# Patient Record
Sex: FEMALE | Race: ASIAN | Employment: FULL TIME | URBAN - METROPOLITAN AREA
[De-identification: names, ages, dates, MRNs, and addresses within clinical notes are randomized per-mention and may not be internally consistent; named-entity substitution may affect disease eponyms.]

---

## 2018-10-24 ENCOUNTER — HOSPITAL ENCOUNTER (EMERGENCY)
Facility: CLINIC | Age: 28
Discharge: HOME | End: 2018-10-24
Payer: COMMERCIAL

## 2018-10-25 ENCOUNTER — OFFICE VISIT (OUTPATIENT)
Dept: OBGYN | Facility: CLINIC | Age: 28
End: 2018-10-25
Payer: COMMERCIAL

## 2018-10-25 ENCOUNTER — RADIANT APPOINTMENT (OUTPATIENT)
Dept: ULTRASOUND IMAGING | Facility: CLINIC | Age: 28
End: 2018-10-25
Attending: FAMILY MEDICINE
Payer: COMMERCIAL

## 2018-10-25 VITALS
BODY MASS INDEX: 22.24 KG/M2 | HEIGHT: 61 IN | DIASTOLIC BLOOD PRESSURE: 72 MMHG | SYSTOLIC BLOOD PRESSURE: 108 MMHG | WEIGHT: 117.8 LBS

## 2018-10-25 DIAGNOSIS — O20.0 THREATENED MISCARRIAGE: ICD-10-CM

## 2018-10-25 DIAGNOSIS — N83.209 CYST OF OVARY, UNSPECIFIED LATERALITY: ICD-10-CM

## 2018-10-25 DIAGNOSIS — O20.0 THREATENED MISCARRIAGE: Primary | ICD-10-CM

## 2018-10-25 DIAGNOSIS — O03.9 SPONTANEOUS ABORTION: Primary | ICD-10-CM

## 2018-10-25 LAB — B-HCG SERPL-ACNC: 4 IU/L (ref 0–5)

## 2018-10-25 PROCEDURE — 84702 CHORIONIC GONADOTROPIN TEST: CPT | Performed by: FAMILY MEDICINE

## 2018-10-25 PROCEDURE — 88305 TISSUE EXAM BY PATHOLOGIST: CPT | Performed by: FAMILY MEDICINE

## 2018-10-25 PROCEDURE — 00000159 ZZHCL STATISTIC H-SEND OUTS PREP: Performed by: FAMILY MEDICINE

## 2018-10-25 PROCEDURE — 36415 COLL VENOUS BLD VENIPUNCTURE: CPT | Performed by: FAMILY MEDICINE

## 2018-10-25 PROCEDURE — 99203 OFFICE O/P NEW LOW 30 MIN: CPT | Performed by: FAMILY MEDICINE

## 2018-10-25 PROCEDURE — 76817 TRANSVAGINAL US OBSTETRIC: CPT | Performed by: FAMILY MEDICINE

## 2018-10-25 PROCEDURE — 76815 OB US LIMITED FETUS(S): CPT | Performed by: FAMILY MEDICINE

## 2018-10-25 NOTE — PATIENT INSTRUCTIONS
Return in 1 week, will call with result     Call on Saturday with lab result   [phone]  Call for result       Dr. Eleanor Sparks,     Obstetrics and Gynecology  [facility] [place] and [place]

## 2018-10-25 NOTE — PROGRESS NOTES
SUBJECTIVE:  Fred Hernandez is an 28 year old woman who presents for   gynecology consult for spotting in early pregnancy, possible miscarriage.  Patient's last menstrual period was 09/18/2018. Currently 5w2d -- experiencing vaginal bleeding, not menses.    Current contraception: none  History of abnormal Pap smear: No  Family history of uterine or ovarian cancer: No  History of abnormal mammogram: No  Family history of breast cancer: Yes - distant cousin    Concerns today:     - Pt experienced a lot of bleeding yesterday, reported to ED but the wait was too long & she was sure she had lost the fetus. The symptoms began with moderate to severe abdominal/pelvic cramping & then began noticing vaginal bleeding with clotting. Both have since begun resolving, with the bleeding now only mild spotting & the pelvic cramping dull instead of intense.    > This is patient's first pregnancy [no previous miscarriages], began trying in Aug  2018 & confirmed pregnancy on 10/22/2018    - Used withdrawal method & natural family planning for contraception   > Two cousins with strong hx of miscarriage      History reviewed. No pertinent past medical history.       Family History   Problem Relation Age of Onset     Diabetes Mother      Hypertension Mother      Diabetes Maternal Grandmother      Hypertension Maternal Grandmother      Hypertension Maternal Grandfather      Lung Cancer Maternal Grandfather      Breast Cancer Other        History reviewed. No pertinent surgical history.    No current outpatient prescriptions on file.     No current facility-administered medications for this visit.      Not on File    Social History   Substance Use Topics     Smoking status: Never Smoker     Smokeless tobacco: Never Used     Alcohol use No       Review Of Systems  Ears/Nose/Throat: negative  Respiratory: No shortness of breath, dyspnea on exertion, cough, or hemoptysis  Cardiovascular: negative  Gastrointestinal:  "negative  Genitourinary: vaginal bleeding & pelvic cramping; otherwise negative  Constitutional, HEENT, cardiovascular, pulmonary, GI, , musculoskeletal, neuro, skin, endocrine and psych systems are negative, except as otherwise noted.      This document serves as a record of the services and decisions personally performed and made by Eleanor Sparks DO. It was created on her behalf by Rylee Menchaca, a trained medical scribe. The creation of this document is based the provider's statements to the medical scribe.  Scribe Rylee Menchaca 8:23 AM, October 25, 2018    OBJECTIVE:  /72  Ht 1.549 m (5' 1\")  Wt 53.4 kg (117 lb 12.8 oz)  LMP 09/18/2018  BMI 22.26 kg/m2  General appearance: healthy, alert and no distress  EYES EOMI, intact visual fields   HENT: Normocephalic.   NECK: no adenopathy, no asymmetry, masses, or scars   Lungs: negative, Percussion normal. Good diaphragmatic excursion. Lungs clear  Heart: negative, PMI normal. No lifts, heaves, or thrills. RRR. No murmurs, clicks gallops or rub  SKIN: no ulcers, lesions or rash  NEURO: alert/oriented to person, location and time, CN 2-12 intact, brisk, strength 5/5 throughout and symmetric, sensation to light touch grossly intact throughout  PSYCH: NEGATIVE  Abdomen: Mild tenderness with bimanual pelvic exam: Otherwise abdomen soft, non-tender. BS normal. No masses, organomegaly  Pelvic: Pelvic examination without pap/ Gonorrhea and Chlamydia   including  External genitalia normal   and vagina normal rugatted not atrophic  Examination of urethra normal no masses, tenderness, scarring  bladder, no masses or tenderness  Cervix no lesions or discharge  Bimanual exam with   Uterus 7 weeks size, mid position, mobile, mild tenderness, no descent   Adnexa/parametria normal  Clotting & tissue seen in cervix, gently removed with ring forceps     LABS:  HCG    ASSESSMENT:  Fred Hernandez is an 28 year old woman who presents for   gynecology consult for spotting in " early pregnancy, possible miscarriage.     PLAN:  Dx: Miscarriage vs ectopic pregnancy vs threatened   1)  Labs pending; Tissue sample - pathology pending, possible placental tissue; US ordered to confirm, scheduled for today 10/25/2018   - Advised to call on Saturday after lab draw for result; if HCG levels are decreasing it is most likely miscarriage, if increasing/doubling then we will follow closely to rule out ectopic pregnancy    > Strongly encouraged to report to ED with any severe abdominal/pelvic pain  2)  Informed workup can be completed to check for possible causes of miscarriage, due to strong family hx   - Pt will wait to do so at this time, will reconsider if another miscarriage occurs  3)  Return to clinic as needed.       Rx:  None      The information in this document, created by the medical scribe for me, accurately reflects the services I personally performed and the decisions made by me. I have reviewed and approved this document for accuracy prior to leaving the patient care area.  8:23 AM, 10/25/18    Dr. Eleanor Sparks, DO    Obstetrics and Gynecology  [facility] and [place]

## 2018-10-25 NOTE — NURSING NOTE
Scheduled for ultrasound today at 1:15pm in [facility]. Pt advised and given instructions and directions.  Myla Ahumada CMA

## 2018-10-25 NOTE — MR AVS SNAPSHOT
After Visit Summary   10/25/2018    Frde Hernandez    MRN: [id number]           Patient Information     Date Of Birth          1990        Visit Information        Provider Department      10/25/2018 8:00 AM Eleanor Sparks,  [facility]        Today's Diagnoses     Threatened miscarriage    -  1      Care Instructions    Return in 1 week, will call with result     Call on Saturday with lab result   [phone]  Call for result       Dr. Eleanor Sparks,     Obstetrics and Gynecology  [facility] and [place]                 Follow-ups after your visit        Your next 10 appointments already scheduled     Nov 05, 2018  2:00 PM CST   New Prenatal with RI PRENATAL NURSE   [facility] ([facility]    [address]  [facility] [id number] [phone]            Nov 19, 2018  5:30 PM CST   Ultrasound with RIUS1   [facility] ([facility])    [address]  [address]  [facility] [id number] [phone]            Dec 11, 2018  4:00 PM CST   New Prenatal with Jelani Carpenter MD   [facility] ([facility])    [address]  [address]  [facility] [phone] [phone]              Future tests that were ordered for you today     Open Standing Orders        Priority Remaining Interval Expires Ordered    HCG quantitative pregnancy STAT 6/6 48 hours 10/25/2019 10/25/2018          Open Future Orders        Priority Expected Expires Ordered    US OB < 14 Weeks Single Routine 10/25/2018 10/25/2019 10/25/2018            Who to contact     If you have questions or need follow up information about today's clinic visit or your schedule please contact [facility] directly at [phone].  Normal or non-critical lab and imaging results will be communicated to you by MyChart, letter or phone within 4  "business days after the clinic has received the results. If you do not hear from us within 7 days, please contact the clinic through [employer] or phone. If you have a critical or abnormal lab result, we will notify you by phone as soon as possible.  Submit refill requests through [employer] or call your pharmacy and they will forward the refill request to us. Please allow 3 business days for your refill to be completed.          Additional Information About Your Visit        [employer] Information     [employer] lets you send messages to your doctor, view your test results, renew your prescriptions, schedule appointments and more. To sign up, go to [email or web address] . Click on \"Log in\" on the left side of the screen, which will take you to the Welcome page. Then click on \"Sign up Now\" on the right side of the page.     You will be asked to enter the access code listed below, as well as some personal information. Please follow the directions to create your username and password.     Your access code is: [email or web address]  Expires: 2019  8:40 AM     Your access code will  in 90 days. If you need help or a new code, please call your [facility] clinic or [phone].        Care EveryWhere ID     This is your Care EveryWhere ID. This could be used by other organizations to access your [facility] medical records  [id number]        Your Vitals Were     Height Last Period BMI (Body Mass Index)             5' 1\" (1.549 m) 2018 22.26 kg/m2          Blood Pressure from Last 3 Encounters:   10/25/18 108/72    Weight from Last 3 Encounters:   10/25/18 117 lb 12.8 oz (53.4 kg)               Primary Care Provider Fax #    Physician No Ref-Primary [phone]       No address on file        Equal Access to Services     BROOKS TUCKER : Kalli Villarreal, belkys moore, derian issa, dominick wilson. So [facility] [phone].    ATENCIÓN: Si habla español, tiene a simons disposición " servicios gratuitos de asistencia lingüística. Maria C orozco [phone].    We comply with applicable federal civil rights laws and [place] laws. We do not discriminate on the basis of race, color, national origin, age, disability, sex, sexual orientation, or gender identity.            Thank you!     Thank you for choosing [facility]  for your care. Our goal is always to provide you with excellent care. Hearing back from our patients is one way we can continue to improve our services. Please take a few minutes to complete the written survey that you may receive in the mail after your visit with us. Thank you!             Your Updated Medication List - Protect others around you: Learn how to safely use, store and throw away your medicines at www.disposemymeds.org.      Notice  As of 10/25/2018  8:40 AM    You have not been prescribed any medications.

## 2018-10-26 ENCOUNTER — TELEPHONE (OUTPATIENT)
Dept: OBGYN | Facility: CLINIC | Age: 28
End: 2018-10-26

## 2018-10-26 LAB — COPATH REPORT: NORMAL

## 2018-10-26 NOTE — TELEPHONE ENCOUNTER
Called by pathology. No products of conception in specimen. Chart reviewed HCG yesterday is 4. Please notify patient. Repeat quantitative HCG on Monday.

## 2018-10-27 ENCOUNTER — HOSPITAL ENCOUNTER (OUTPATIENT)
Dept: LAB | Facility: CLINIC | Age: 28
Discharge: HOME | End: 2018-10-27
Admitting: FAMILY MEDICINE
Payer: COMMERCIAL

## 2018-10-27 DIAGNOSIS — O20.0 THREATENED MISCARRIAGE: ICD-10-CM

## 2018-10-27 LAB — B-HCG SERPL-ACNC: 1 IU/L (ref 0–5)

## 2018-10-27 PROCEDURE — 36415 COLL VENOUS BLD VENIPUNCTURE: CPT | Performed by: FAMILY MEDICINE

## 2018-10-27 PROCEDURE — 84702 CHORIONIC GONADOTROPIN TEST: CPT | Performed by: FAMILY MEDICINE

## 2018-11-21 ENCOUNTER — HEALTH MAINTENANCE LETTER (OUTPATIENT)
Age: 28
End: 2018-11-21

## 2019-01-23 ENCOUNTER — HOSPITAL ENCOUNTER (OUTPATIENT)
Dept: ULTRASOUND IMAGING | Facility: CLINIC | Age: 29
Discharge: HOME | End: 2019-01-23
Attending: UROLOGY | Admitting: UROLOGY
Payer: COMMERCIAL

## 2019-01-23 ENCOUNTER — OFFICE VISIT (OUTPATIENT)
Dept: OBGYN | Facility: CLINIC | Age: 29
End: 2019-01-23
Payer: COMMERCIAL

## 2019-01-23 VITALS
SYSTOLIC BLOOD PRESSURE: 104 MMHG | DIASTOLIC BLOOD PRESSURE: 68 MMHG | HEIGHT: 61 IN | BODY MASS INDEX: 22.22 KG/M2 | WEIGHT: 117.7 LBS

## 2019-01-23 DIAGNOSIS — Z87.59 HISTORY OF MISCARRIAGE: ICD-10-CM

## 2019-01-23 DIAGNOSIS — Z32.01 PREGNANCY TEST POSITIVE: ICD-10-CM

## 2019-01-23 DIAGNOSIS — Z32.01 PREGNANCY TEST POSITIVE: Primary | ICD-10-CM

## 2019-01-23 LAB
ABO + RH BLD: NORMAL
ABO + RH BLD: NORMAL
B-HCG SERPL-ACNC: 5923 IU/L (ref 0–5)
SPECIMEN EXP DATE BLD: NORMAL

## 2019-01-23 PROCEDURE — 99213 OFFICE O/P EST LOW 20 MIN: CPT | Performed by: OBSTETRICS & GYNECOLOGY

## 2019-01-23 PROCEDURE — 86900 BLOOD TYPING SEROLOGIC ABO: CPT | Performed by: OBSTETRICS & GYNECOLOGY

## 2019-01-23 PROCEDURE — 84702 CHORIONIC GONADOTROPIN TEST: CPT | Performed by: OBSTETRICS & GYNECOLOGY

## 2019-01-23 PROCEDURE — 76801 OB US < 14 WKS SINGLE FETUS: CPT

## 2019-01-23 PROCEDURE — 36415 COLL VENOUS BLD VENIPUNCTURE: CPT | Performed by: OBSTETRICS & GYNECOLOGY

## 2019-01-23 PROCEDURE — 86901 BLOOD TYPING SEROLOGIC RH(D): CPT | Performed by: OBSTETRICS & GYNECOLOGY

## 2019-01-23 ASSESSMENT — MIFFLIN-ST. JEOR: SCORE: 1191.26

## 2019-01-23 NOTE — PROGRESS NOTES
"Chief Complaint   Patient presents with     Physical     fasting       Subjective:  27 yo  presents for early pregnancy confirmation. 2 + HPTs    Had early loss in 10/2018.  Certain LMP 18 suggesting 7w6d presently but LMP was after sab.    Denies pain or bleeding.    REVIEW OF SYSTEMS:  Neg except as above    Health Maintenance   Topic Date Due     PHQ-2 Q1 YR  2000     HIV SCREEN (SYSTEM ASSIGNED)  2006     PAP SCREENING Q3 YR (SYSTEM ASSIGNED)  2009     DTAP/TDAP/TD IMMUNIZATION (1 - Tdap) 2013     INFLUENZA VACCINE (1) 2018     ZOSTER IMMUNIZATION (1 of 2) 2038     IPV IMMUNIZATION  Aged Out     MENINGITIS IMMUNIZATION  Aged Out       No Known Allergies    Objective:  Vitals: /68   Ht 1.549 m (5' 1\")   Wt 53.4 kg (117 lb 11.2 oz)   LMP 2018 (Exact Date)   BMI 22.24 kg/m    BMI= Body mass index is 22.24 kg/m .    Attempted bedside sono but was unable to well visualize the uterus due to retroversion  Assessment/Plan:  1. Pregnancy test positive  Hx of early loss 10/2018  - US Pelvic Complete with Transvaginal; Future  - HCG Quantitative Pregnancy, Blood (TBQ814); Future  - HCG Quantitative Pregnancy, Blood (SKB851)  - ABO and Rh    2. History of miscarriage    - US Pelvic Complete with Transvaginal; Future  - HCG Quantitative Pregnancy, Blood (JOF267); Future  - HCG Quantitative Pregnancy, Blood (LWQ211)  - ABO and Rh        Francis Carpenter MD  "

## 2019-01-23 NOTE — NURSING NOTE
"Chief Complaint   Patient presents with     Physical     fasting   Patient states positive home pregnancy test last week.    Initial /68   Ht 1.549 m (5' 1\")   Wt 53.4 kg (117 lb 11.2 oz)   LMP 2018 (Exact Date)   BMI 22.24 kg/m   Estimated body mass index is 22.24 kg/m  as calculated from the following:    Height as of this encounter: 1.549 m (5' 1\").    Weight as of this encounter: 53.4 kg (117 lb 11.2 oz).  BP completed using cuff size: regular    Questioned patient about current smoking habits.  Pt. has never smoked.          The following HM Due: khadar Robert CMA                "

## 2019-01-25 DIAGNOSIS — O20.0 THREATENED MISCARRIAGE: ICD-10-CM

## 2019-01-25 LAB — B-HCG SERPL-ACNC: 7214 IU/L (ref 0–5)

## 2019-01-25 PROCEDURE — 36415 COLL VENOUS BLD VENIPUNCTURE: CPT | Performed by: FAMILY MEDICINE

## 2019-01-25 PROCEDURE — 84702 CHORIONIC GONADOTROPIN TEST: CPT | Performed by: FAMILY MEDICINE

## 2019-01-28 ENCOUNTER — MYC MEDICAL ADVICE (OUTPATIENT)
Dept: OBGYN | Facility: CLINIC | Age: 29
End: 2019-01-28

## 2019-01-28 DIAGNOSIS — Z32.01 PREGNANCY EXAMINATION OR TEST, POSITIVE RESULT: Primary | ICD-10-CM

## 2019-02-01 ENCOUNTER — ANCILLARY PROCEDURE (OUTPATIENT)
Dept: ULTRASOUND IMAGING | Facility: CLINIC | Age: 29
End: 2019-02-01
Payer: COMMERCIAL

## 2019-02-01 ENCOUNTER — TELEPHONE (OUTPATIENT)
Dept: OBGYN | Facility: CLINIC | Age: 29
End: 2019-02-01

## 2019-02-01 DIAGNOSIS — Z32.01 PREGNANCY EXAMINATION OR TEST, POSITIVE RESULT: ICD-10-CM

## 2019-02-01 DIAGNOSIS — Z87.59 HISTORY OF MISCARRIAGE: ICD-10-CM

## 2019-02-01 DIAGNOSIS — Z32.01 PREGNANCY TEST POSITIVE: ICD-10-CM

## 2019-02-01 LAB — B-HCG SERPL-ACNC: ABNORMAL IU/L (ref 0–5)

## 2019-02-01 PROCEDURE — 84702 CHORIONIC GONADOTROPIN TEST: CPT | Performed by: OBSTETRICS & GYNECOLOGY

## 2019-02-01 PROCEDURE — 76817 TRANSVAGINAL US OBSTETRIC: CPT | Performed by: OBSTETRICS & GYNECOLOGY

## 2019-02-01 PROCEDURE — 36415 COLL VENOUS BLD VENIPUNCTURE: CPT | Performed by: OBSTETRICS & GYNECOLOGY

## 2019-02-01 PROCEDURE — 76815 OB US LIMITED FETUS(S): CPT | Performed by: OBSTETRICS & GYNECOLOGY

## 2019-02-01 NOTE — TELEPHONE ENCOUNTER
Pt was in for an US today, 2/1/19, and following the appointment stated she needed to schedule her initial prenatal care visits. Scheduled the pt for her NPN and first New Prenatal with provider, pt is wondering if she will need to do another US in between those 2 scheduled appointments or if the US today would replace that? Please advise

## 2019-02-01 NOTE — TELEPHONE ENCOUNTER
Discussed with Gypsy that most likely the pt will not need a repeat US. She will only need one repeated if there is an abnormal finding on her early US, or if baby is measuring too small to confirm viability.        Elly Raman RN

## 2019-02-05 ENCOUNTER — TELEPHONE (OUTPATIENT)
Dept: OBGYN | Facility: CLINIC | Age: 29
End: 2019-02-05

## 2019-02-05 NOTE — TELEPHONE ENCOUNTER
Pt calls with light brown spotting, no cramping.    Advised to monitor and let us know if it increase or cramping increases.    Pt comfortable with this plan.    Lab Results   Component Value Date    ABO O 01/23/2019    RH Pos 01/23/2019         Allie HANSEN R.N.  [facility]

## 2019-02-06 ENCOUNTER — TELEPHONE (OUTPATIENT)
Dept: OBGYN | Facility: CLINIC | Age: 29
End: 2019-02-06

## 2019-02-06 DIAGNOSIS — O26.859 SPOTTING IN EARLY PREGNANCY: Primary | ICD-10-CM

## 2019-02-06 DIAGNOSIS — O20.0 THREATENED MISCARRIAGE: ICD-10-CM

## 2019-02-06 LAB — B-HCG SERPL-ACNC: ABNORMAL IU/L (ref 0–5)

## 2019-02-06 PROCEDURE — 84702 CHORIONIC GONADOTROPIN TEST: CPT | Performed by: FAMILY MEDICINE

## 2019-02-06 PROCEDURE — 36415 COLL VENOUS BLD VENIPUNCTURE: CPT | Performed by: FAMILY MEDICINE

## 2019-02-06 NOTE — TELEPHONE ENCOUNTER
Component      Latest Ref Rng & Units 1/25/2019 2/1/2019 2/6/2019   HCG Quantitative Serum      0 - 5 IU/L 7,214 (H) 14,140 (H) 18,164 (H)   Results reviewed  With Dr Cueva.'  Recommend repeat ultrasound 1 week from last ultrasound.  Patient advises to call back if bleeding worsens, pelvic pain or for any other concerns.  Rosana Gale RN

## 2019-02-06 NOTE — TELEPHONE ENCOUNTER
Patient calling:  Blood type O +.   states spotting has increased. Brown.  Some cramping, more pressure.  States no longer has pregnancy symptoms.    GA 7wk    Component      Latest Ref Rng & Units 1/25/2019 2/1/2019   HCG Quantitative Serum      0 - 5 IU/L 7,214 (H) 14,140 (H)     Will repeat HCG quant and possible repeat US after labs return.  See ultrasound result 2/1/19  Rosana Gale RN

## 2019-02-08 ENCOUNTER — HOSPITAL ENCOUNTER (OUTPATIENT)
Dept: ULTRASOUND IMAGING | Facility: CLINIC | Age: 29
Discharge: HOME | End: 2019-02-08
Attending: OBSTETRICS & GYNECOLOGY | Admitting: OBSTETRICS & GYNECOLOGY
Payer: COMMERCIAL

## 2019-02-08 DIAGNOSIS — O26.859 SPOTTING IN EARLY PREGNANCY: ICD-10-CM

## 2019-02-08 PROCEDURE — 76801 OB US < 14 WKS SINGLE FETUS: CPT

## 2019-02-11 DIAGNOSIS — Z34.90 SUPERVISION OF NORMAL PREGNANCY: Primary | ICD-10-CM

## 2019-02-28 ENCOUNTER — PRENATAL OFFICE VISIT (OUTPATIENT)
Dept: NURSING | Facility: CLINIC | Age: 29
End: 2019-02-28
Payer: COMMERCIAL

## 2019-02-28 DIAGNOSIS — Z34.90 SUPERVISION OF NORMAL PREGNANCY: ICD-10-CM

## 2019-02-28 LAB
ERYTHROCYTE [DISTWIDTH] IN BLOOD BY AUTOMATED COUNT: 12.2 % (ref 10–15)
HCT VFR BLD AUTO: 42.2 % (ref 35–47)
HGB BLD-MCNC: 13.9 G/DL (ref 11.7–15.7)
MCH RBC QN AUTO: 30.7 PG (ref 26.5–33)
MCHC RBC AUTO-ENTMCNC: 32.9 G/DL (ref 31.5–36.5)
MCV RBC AUTO: 93 FL (ref 78–100)
PLATELET # BLD AUTO: 283 10E9/L (ref 150–450)
RBC # BLD AUTO: 4.53 10E12/L (ref 3.8–5.2)
WBC # BLD AUTO: 5.9 10E9/L (ref 4–11)

## 2019-02-28 PROCEDURE — 86762 RUBELLA ANTIBODY: CPT | Performed by: OBSTETRICS & GYNECOLOGY

## 2019-02-28 PROCEDURE — 87340 HEPATITIS B SURFACE AG IA: CPT | Performed by: OBSTETRICS & GYNECOLOGY

## 2019-02-28 PROCEDURE — 99207 ZZC NO CHARGE NURSE ONLY: CPT

## 2019-02-28 PROCEDURE — 87086 URINE CULTURE/COLONY COUNT: CPT | Performed by: OBSTETRICS & GYNECOLOGY

## 2019-02-28 PROCEDURE — 87389 HIV-1 AG W/HIV-1&-2 AB AG IA: CPT | Performed by: OBSTETRICS & GYNECOLOGY

## 2019-02-28 PROCEDURE — 0064U ANTB TP TOTAL&RPR IA QUAL: CPT | Performed by: OBSTETRICS & GYNECOLOGY

## 2019-02-28 PROCEDURE — 36415 COLL VENOUS BLD VENIPUNCTURE: CPT | Performed by: OBSTETRICS & GYNECOLOGY

## 2019-02-28 PROCEDURE — 85027 COMPLETE CBC AUTOMATED: CPT | Performed by: OBSTETRICS & GYNECOLOGY

## 2019-02-28 NOTE — NURSING NOTE
NPN nurse visit. 1st dr visit scheduled for 3/13/19 with Faisal Young M.D.  Pap not due. Last pap 2016?.  10w1d    LAURA Calhoun RN

## 2019-03-01 ENCOUNTER — NURSE TRIAGE (OUTPATIENT)
Dept: NURSING | Facility: CLINIC | Age: 29
End: 2019-03-01

## 2019-03-01 LAB
BACTERIA SPEC CULT: NO GROWTH
HBV SURFACE AG SERPL QL IA: NONREACTIVE
HIV 1+2 AB+HIV1 P24 AG SERPL QL IA: NONREACTIVE
RUBV IGG SERPL IA-ACNC: 98 IU/ML
SPECIMEN SOURCE: NORMAL
T PALLIDUM AB SER QL: NONREACTIVE

## 2019-03-02 NOTE — TELEPHONE ENCOUNTER
"\"I am 10 weeks pregnant, I was seen just for a nurse visit( see epic). All labs were normal. But last week 1-2 days I had darker brown discharge and today I am having it. It's a very small amount either on a panty liner or in my underwear.\"Denies pain, bright red bleeding, odor fever or other sx. Triaged and gave home care advice. Call back if needed.    Additional Information    Negative: [1] Vaginal bleeding AND [2] pregnant > 20 weeks    Negative: [1] Vaginal bleeding AND [2] pregnant < 20 weeks    Negative: [1] Having contractions or other symptoms of labor AND [2] < 37 weeks pregnant (i.e., )    Negative: [1] Having contractions or other symptoms of labor AND [2] > 36 weeks pregnant (i.e., term pregnancy)    Negative: Leakage of fluid (trickle, gush) from the vagina    Negative: Foreign body in vagina (e.g., tampon)    Negative: Pain or burning with urination is main symptom    Negative: [1] Pregnant 23 or more weeks AND [2] baby is moving less today (e.g., kick count < 5 in 1 hour or < 10 in 2 hours)    Negative: Patient sounds very sick or weak to the triager    Negative: [1] Constant abdominal pain AND [2] present > 2 hours    Negative: [1] Intermittent lower abdominal pain AND [2] present > 24 hours    Negative: [1] Pregnant 24-36 weeks () AND [2] pinkish or brownish mucous discharge    Negative: [1] Yellow or green vaginal discharge AND [2] fever    Negative: Painful rash with tiny water blisters    Negative: [1] Rash (e.g., redness, tiny bumps, sore) of genital area AND [2] present > 24 hours    Negative: Abnormal color vaginal discharge (i.e., yellow, green, gray)    Negative: Bad smelling vaginal discharge    Negative: Tender lump (swelling or \"ball\") at vaginal opening    Negative: [1] Symptoms of a \"yeast infection\" (i.e., itchy, white discharge, not bad smelling) AND [2] not improved > 3 days following CARE ADVICE    Negative: Patient is worried about sexually transmitted disease " (STD)    Negative: Pain with sexual intercourse (dyspareunia)    Negative: [1] Pregnant > 36 weeks (term) AND [2] pinkish or brownish mucous discharge (all triage questions negative)    Negative: [1] Pregnant > 36 weeks (term) AND [2] passed a small glob or chunk of mucous (may look like gelatin or snot) (all triage questions negative)    Negative: [1] Rash (e.g., redness, tiny bumps, sore) of genital area AND [2] present < 24 hours    Negative: Symptoms of a vaginal yeast infection (i.e., white, thick, cottage-cheese-like, itchy, not bad smelling discharge)    Normal vaginal discharge in pregnancy (all triage questions negative)    Protocols used: PREGNANCY - VAGINAL DISCHARGE-ADULT-

## 2019-03-03 ENCOUNTER — NURSE TRIAGE (OUTPATIENT)
Dept: NURSING | Facility: CLINIC | Age: 29
End: 2019-03-03

## 2019-03-03 NOTE — TELEPHONE ENCOUNTER
10.5 weeks pregnant  Patient calls re: persistent vaginal bleeding/discharge.  Patient requested FNA speak to  instead.   reports patient has been having spotting for the past 2-3 weeks.   says patient now has more bleeding.  Patient soaked about 1/5 of a sanitary pad today which is more than what has been happening.   describes blood as maroon in color.   says patient felt sick last night.  Unable to check if patient has a fever or not because does not have a thermometer.  Patient has no abdominal pain or cramping.  Per , patient did have a miscarriage 4 months ago.  Reviewed guideline and care advice with caller to have patient be evaluated within 3 days.  Caller verbalizes understanding.  Transferred call to schedule office visit per guideline.      Reason for Disposition    MILD vaginal bleeding (i.e., clots or similar to menstrual period; not just spotting)    Additional Information    Negative: Shock suspected (e.g., cold/pale/clammy skin, too weak to stand, low BP, rapid pulse)    Negative: Difficult to awaken or acting confused  (e.g., disoriented, slurred speech)    Negative: Passed out (i.e., lost consciousness, collapsed and was not responding)    Negative: Sounds like a life-threatening emergency to the triager    Negative: [1] Vaginal bleeding AND [2] pregnant > 20 weeks    Negative: Not pregnant or pregnancy status unknown    Negative: SEVERE abdominal pain    Negative: [1] SEVERE vaginal bleeding (i.e., soaking 2 pads / hour, large blood clots) AND [2] present 2 or more hours    Negative: [1] MODERATE vaginal bleeding (i.e., soaking 1 pad / hour; clots) AND [2] present > 6 hours    Negative: [1] MODERATE vaginal bleeding (i.e., soaking 1 pad / hour; clots) AND [2] pregnant > 12 weeks    Negative: Passed tissue (e.g., gray-white)    Negative: Shoulder pain    Negative: Pale skin (pallor) of new onset or worsening    Negative: Patient sounds very sick or weak to  "the triager    Negative: [1] Constant abdominal pain AND [2] present > 2 hours    Negative: Fever > 100.4 F (38.0 C)    Negative: [1] Intermittent lower abdominal pain (e.g., cramping) AND [2] present > 24 hours    Negative: Prior history of \"ectopic pregnancy\" or previous tubal surgery (e.g., tubal ligation)    Negative: Burning with urination    Negative: Has IUD    Protocols used: PREGNANCY - VAGINAL BLEEDING LESS THAN 20 WEEKS [id number]      "

## 2019-03-04 ENCOUNTER — OFFICE VISIT (OUTPATIENT)
Dept: OBGYN | Facility: CLINIC | Age: 29
End: 2019-03-04
Payer: COMMERCIAL

## 2019-03-04 ENCOUNTER — ANCILLARY PROCEDURE (OUTPATIENT)
Dept: ULTRASOUND IMAGING | Facility: CLINIC | Age: 29
End: 2019-03-04
Attending: OBSTETRICS & GYNECOLOGY
Payer: COMMERCIAL

## 2019-03-04 VITALS — DIASTOLIC BLOOD PRESSURE: 62 MMHG | BODY MASS INDEX: 23.35 KG/M2 | SYSTOLIC BLOOD PRESSURE: 108 MMHG | WEIGHT: 123.6 LBS

## 2019-03-04 DIAGNOSIS — O03.9 SAB (SPONTANEOUS ABORTION): Primary | ICD-10-CM

## 2019-03-04 DIAGNOSIS — O20.0 THREATENED ABORTION: ICD-10-CM

## 2019-03-04 PROCEDURE — 76815 OB US LIMITED FETUS(S): CPT | Performed by: OBSTETRICS & GYNECOLOGY

## 2019-03-04 PROCEDURE — 99214 OFFICE O/P EST MOD 30 MIN: CPT | Performed by: OBSTETRICS & GYNECOLOGY

## 2019-03-04 RX ORDER — ACETAMINOPHEN 500 MG
TABLET ORAL
Status: ON HOLD | COMMUNITY
End: 2021-09-28

## 2019-03-04 RX ORDER — MISOPROSTOL 200 UG/1
800 TABLET ORAL EVERY 12 HOURS PRN
Qty: 8 TABLET | Refills: 0 | Status: SHIPPED | OUTPATIENT
Start: 2019-03-04 | End: 2019-03-19

## 2019-03-04 RX ORDER — MISOPROSTOL 200 UG/1
800 TABLET ORAL EVERY 12 HOURS PRN
Qty: 8 TABLET | Refills: 0 | Status: SHIPPED | OUTPATIENT
Start: 2019-03-04 | End: 2019-03-04

## 2019-03-04 NOTE — NURSING NOTE
"Chief Complaint   Patient presents with     Prenatal Care     pt is 10 weeks 5 days     Vaginal Bleeding     off and on for the past 2 weeks, yesterday blood was brighter red, and she passed some clots. Patient also reports that she had cramps yesterday.        Initial /62 (BP Location: Right arm, Patient Position: Chair, Cuff Size: Adult Regular)   Wt 56.1 kg (123 lb 9.6 oz)   LMP  (LMP Unknown)   BMI 23.35 kg/m   Estimated body mass index is 23.35 kg/m  as calculated from the following:    Height as of 19: 1.549 m (5' 1\").    Weight as of this encounter: 56.1 kg (123 lb 9.6 oz).  BP completed using cuff size: regular    Questioned patient about current smoking habits.  Pt. has never smoked.          The following HM Due: NONE      Fausto Major CMA               "

## 2019-03-04 NOTE — PROGRESS NOTES
SUBJECTIVE:  Fred Hernandez is a 28 year old, , presents with recurrent vaginal bleeding. Now continued spotting. 10w5d.    Family History   Problem Relation Age of Onset     Diabetes Mother      Hypertension Mother      Diabetes Maternal Grandmother      Hypertension Maternal Grandmother      Hypertension Maternal Grandfather      Lung Cancer Maternal Grandfather      Breast Cancer Other        Past Medical History:   Diagnosis Date     NO ACTIVE PROBLEMS 2019                                          Past Surgical History:   Procedure Laterality Date     NO HISTORY OF SURGERY  2019       Current Outpatient Medications   Medication     Bioflavonoid Products (VITAMIN C PLUS) 1000 MG TABS     Calcium Carbonate-Vit D-Min (CALCIUM 1200) 9517-3462 MG-UNIT CHEW     MAGNESIUM GLYCINATE PLUS PO     Omega-3 Fatty Acids (FISH OIL PO)     PRENATAL VIT-DSS-FE FUM-FA PO     No current facility-administered medications for this visit.          No Known Allergies                                                Social History     Tobacco Use     Smoking status: Never Smoker     Smokeless tobacco: Never Used   Substance Use Topics     Alcohol use: No                      Review of Systems    CONSTITUTIONAL:NEGATIVE  EYES: NEGATIVE  ENT/MOUTH: NEGATIVE  RESP: NEGATIVE  CV: NEGATIVE  GI: NEGATIVE  : NEGATIVE  MUSCULOSKELATAL: NEGATIVE  INTEGUMENTARY/SKIN: NEGATIVE  BREAST: NEGATIVE  NEURO: NEGATIVE.      OBJECTIVE:  /62 (BP Location: Right arm, Patient Position: Chair, Cuff Size: Adult Regular)   Wt 56.1 kg (123 lb 9.6 oz)   LMP  (LMP Unknown)   BMI 23.35 kg/m    Pelvis: Small amount of blood and clot, normal external genitalia, normal groin lymphatics, normal urethral meatus, normal vaginal mucosa and normal cervix        ASSESSMENT:  Missed     PLAN:    1) Repeat pelvic ultrasound notes 7 week missed . Discussed medical managent with Cytotec, expectant management, surgical management by D&C. Pt  prefers cytotec. Discussed and ordered. Total time spent was 25 minutes. 25 minutes of face to face time spent counseling and or coordination of care regarding missed  management .

## 2019-03-19 ENCOUNTER — OFFICE VISIT (OUTPATIENT)
Dept: OBGYN | Facility: CLINIC | Age: 29
End: 2019-03-19
Payer: COMMERCIAL

## 2019-03-19 VITALS — DIASTOLIC BLOOD PRESSURE: 68 MMHG | SYSTOLIC BLOOD PRESSURE: 104 MMHG | BODY MASS INDEX: 23.58 KG/M2 | WEIGHT: 124.8 LBS

## 2019-03-19 DIAGNOSIS — Z51.89 FOLLOW-UP VISIT AFTER MISCARRIAGE: Primary | ICD-10-CM

## 2019-03-19 DIAGNOSIS — O03.9 FOLLOW-UP VISIT AFTER MISCARRIAGE: Primary | ICD-10-CM

## 2019-03-19 DIAGNOSIS — Z12.4 SCREENING FOR CERVICAL CANCER: ICD-10-CM

## 2019-03-19 PROCEDURE — 87624 HPV HI-RISK TYP POOLED RSLT: CPT | Performed by: OBSTETRICS & GYNECOLOGY

## 2019-03-19 PROCEDURE — G0145 SCR C/V CYTO,THINLAYER,RESCR: HCPCS | Performed by: OBSTETRICS & GYNECOLOGY

## 2019-03-19 PROCEDURE — 36415 COLL VENOUS BLD VENIPUNCTURE: CPT | Performed by: OBSTETRICS & GYNECOLOGY

## 2019-03-19 PROCEDURE — 84702 CHORIONIC GONADOTROPIN TEST: CPT | Performed by: OBSTETRICS & GYNECOLOGY

## 2019-03-19 PROCEDURE — 99213 OFFICE O/P EST LOW 20 MIN: CPT | Performed by: OBSTETRICS & GYNECOLOGY

## 2019-03-19 NOTE — PROGRESS NOTES
SUBJECTIVE:                                                   Fred Hernandez is a 28 year old female who presents to clinic today for the following health issue(s):  Patient presents with:  Miscarriage: Follow up from 3/4/19 : states bleeding 3/5/19 but only spotting since.  c/o weight gain.      HPI:  29 yo  here for follow up of sab.  Was seen 3/4/19 and diagnosed with 7weeks size IUFD.  Opted for cytotec therapy.  States she had increased bleeding and passage of ?tissue that night.  Bleeding then decreased as have her pregnancy symptoms.      Problem list and histories reviewed & adjusted, as indicated.  Additional history: as documented.    There is no problem list on file for this patient.    Past Surgical History:   Procedure Laterality Date     NO HISTORY OF SURGERY  2019      Social History     Tobacco Use     Smoking status: Never Smoker     Smokeless tobacco: Never Used   Substance Use Topics     Alcohol use: No      Problem (# of Occurrences) Relation (Name,Age of Onset)    Breast Cancer (1) Other (cousin)    Diabetes (2) Mother, Maternal Grandmother    Hypertension (3) Mother, Maternal Grandmother, Maternal Grandfather    Lung Cancer (1) Maternal Grandfather              Current Outpatient Medications on File Prior to Visit:  Bioflavonoid Products (VITAMIN C PLUS) 1000 MG TABS    Calcium Carbonate-Vit D-Min (CALCIUM 1200) 6699-3715 MG-UNIT CHEW    MAGNESIUM GLYCINATE PLUS PO    Omega-3 Fatty Acids (FISH OIL PO)    PRENATAL VIT-DSS-FE FUM-FA PO      No current facility-administered medications on file prior to visit.   No Known Allergies    ROS:  5 point ROS negative except as noted above in HPI, including Gen., Resp., CV, GI &  system review.    OBJECTIVE:     /68   Wt 56.6 kg (124 lb 12.8 oz)   LMP  (Approximate)   BMI 23.58 kg/m     BMI: Body mass index is 23.58 kg/m .  General: Alert and oriented, no distress.  Psychiatric: Mood and affect within normal limits.    Abdomen: Soft,  nontender, no hepatosplenomegaly, no rebound or guarding, no masses, no hernias. Bedside sono performed and visualization difficult but no obvious tissue/fluid in endometrium  Vulva:  No external lesions, normal female hair distribution, no inguinal adenopathy.    Urethra:  Midline, non-tender, well supported, no discharge  Vagina:  Well-estrogenized, no abnormal discharge, no lesions  Cervix: no lesions, no discharge and Pap obtained w/o difficulty  Uterus:  anteverted, smooth contour, without enlargement, mobile, and without tenderness  Ovaries:  No masses appreciated, non-tender, mobile  Rectal Exam: deferred  Musculoskeletal: extremities normal    In-Clinic Test Results:  No results found for this or any previous visit (from the past 24 hour(s)).    ASSESSMENT/PLAN:                                                        ICD-10-CM    1. Follow-up visit after miscarriage Z51.89 HCG quantitative pregnancy    O03.9    2. Screening for cervical cancer Z12.4 Pap imaged thin layer screen with HPV - recommended age 30 - 65 years (select HPV order below)     HPV High Risk Types DNA Cervical     CANCELED: HPV High Risk Types DNA Cervical     CANCELED: Pap imaged thin layer screen with HPV - recommended age 30 - 65 years (select HPV order below)         Clinical picture suggests completed sab following cytotec.  Check quant HCG today and follow until <5.    Jelani Carpenter MD  [facility]

## 2019-03-19 NOTE — NURSING NOTE
"Chief Complaint   Patient presents with     Miscarriage     Follow up from 3/4/19 : states bleeding 3/5/19 but only spotting since.  c/o weight gain.       Initial Wt 56.6 kg (124 lb 12.8 oz)   LMP  (LMP Unknown)   BMI 23.58 kg/m   Estimated body mass index is 23.58 kg/m  as calculated from the following:    Height as of 19: 1.549 m (5' 1\").    Weight as of this encounter: 56.6 kg (124 lb 12.8 oz).  BP completed using cuff size: regular    Questioned patient about current smoking habits.  Pt. has never smoked.          The following HM Due: NONE    Kayley Robert CMA               "

## 2019-03-20 LAB — B-HCG SERPL-ACNC: 11 IU/L (ref 0–5)

## 2019-03-22 LAB
COPATH REPORT: NORMAL
PAP: NORMAL

## 2019-03-25 LAB
FINAL DIAGNOSIS: NORMAL
HPV HR 12 DNA CVX QL NAA+PROBE: NEGATIVE
HPV16 DNA SPEC QL NAA+PROBE: NEGATIVE
HPV18 DNA SPEC QL NAA+PROBE: NEGATIVE
SPECIMEN DESCRIPTION: NORMAL
SPECIMEN SOURCE CVX/VAG CYTO: NORMAL

## 2019-10-28 ENCOUNTER — OFFICE VISIT (OUTPATIENT)
Dept: FAMILY MEDICINE | Facility: CLINIC | Age: 29
End: 2019-10-28
Payer: COMMERCIAL

## 2019-10-28 VITALS
OXYGEN SATURATION: 100 % | TEMPERATURE: 98 F | HEART RATE: 90 BPM | BODY MASS INDEX: 22.09 KG/M2 | DIASTOLIC BLOOD PRESSURE: 60 MMHG | HEIGHT: 61 IN | WEIGHT: 117 LBS | SYSTOLIC BLOOD PRESSURE: 108 MMHG

## 2019-10-28 DIAGNOSIS — Z13.1 SCREENING FOR DIABETES MELLITUS: ICD-10-CM

## 2019-10-28 DIAGNOSIS — Z13.220 SCREENING FOR HYPERLIPIDEMIA: ICD-10-CM

## 2019-10-28 DIAGNOSIS — E55.9 VITAMIN D DEFICIENCY: ICD-10-CM

## 2019-10-28 DIAGNOSIS — Z00.00 ROUTINE GENERAL MEDICAL EXAMINATION AT A HEALTH CARE FACILITY: Primary | ICD-10-CM

## 2019-10-28 LAB — HBA1C MFR BLD: 5 % (ref 0–5.6)

## 2019-10-28 PROCEDURE — 80048 BASIC METABOLIC PNL TOTAL CA: CPT | Performed by: FAMILY MEDICINE

## 2019-10-28 PROCEDURE — 36415 COLL VENOUS BLD VENIPUNCTURE: CPT | Performed by: FAMILY MEDICINE

## 2019-10-28 PROCEDURE — 83036 HEMOGLOBIN GLYCOSYLATED A1C: CPT | Performed by: FAMILY MEDICINE

## 2019-10-28 PROCEDURE — 80061 LIPID PANEL: CPT | Performed by: FAMILY MEDICINE

## 2019-10-28 PROCEDURE — 82306 VITAMIN D 25 HYDROXY: CPT | Performed by: FAMILY MEDICINE

## 2019-10-28 PROCEDURE — 99385 PREV VISIT NEW AGE 18-39: CPT | Performed by: FAMILY MEDICINE

## 2019-10-28 ASSESSMENT — MIFFLIN-ST. JEOR: SCORE: 1183.09

## 2019-10-28 NOTE — PROGRESS NOTES
SUBJECTIVE:   CC: Fred Hernandez is an 29 year old woman who presents for preventive health visit.       Healthy Habits:    Do you get at least three servings of calcium containing foods daily (dairy, green leafy vegetables, etc.)? yes    Amount of exercise or daily activities, outside of work: 5 day(s) per week    Problems taking medications regularly No    Medication side effects: No    Have you had an eye exam in the past two years? yes    Do you see a dentist twice per year? yes    Do you have sleep apnea, excessive snoring or daytime drowsiness?no      Today's PHQ-2 Score:   PHQ-2 ( 1999 Pfizer) 10/28/2019   Q1: Little interest or pleasure in doing things 0   Q2: Feeling down, depressed or hopeless 0   PHQ-2 Score 0       Abuse: Current or Past(Physical, Sexual or Emotional)- No  Do you feel safe in your environment? Yes    Social History     Tobacco Use     Smoking status: Never Smoker     Smokeless tobacco: Never Used   Substance Use Topics     Alcohol use: No     If you drink alcohol do you typically have >3 drinks per day or >7 drinks per week? Not Applicable                     Reviewed orders with patient.  Reviewed health maintenance and updated orders accordingly - Yes  Lab work is in process    Mammogram not appropriate for this patient based on age.    Pertinent mammograms are reviewed under the imaging tab.  History of abnormal Pap smear: NO - age 30-65 PAP every 5 years with negative HPV co-testing recommended  PAP / HPV Latest Ref Rng & Units 3/19/2019   PAP - NIL   HPV 16 DNA NEG:Negative Negative   HPV 18 DNA NEG:Negative Negative   OTHER HR HPV NEG:Negative Negative     Reviewed and updated as needed this visit by clinical staff  Tobacco  Allergies  Meds  Problems  Med Hx  Surg Hx  Fam Hx         Reviewed and updated as needed this visit by Provider  Tobacco  Allergies  Meds  Problems  Med Hx  Surg Hx  Fam Hx        Past Medical History:   Diagnosis Date     NO ACTIVE  "PROBLEMS 2019      Past Surgical History:   Procedure Laterality Date     NO HISTORY OF SURGERY  2019       ROS:  CONSTITUTIONAL: NEGATIVE for fever, chills, change in weight  INTEGUMENTARU/SKIN: NEGATIVE for worrisome rashes, moles or lesions  EYES: NEGATIVE for vision changes or irritation  ENT: NEGATIVE for ear, mouth and throat problems  RESP: NEGATIVE for significant cough or SOB  BREAST: NEGATIVE for masses, tenderness or discharge  CV: NEGATIVE for chest pain, palpitations or peripheral edema  GI: NEGATIVE for nausea, abdominal pain, heartburn, or change in bowel habits  : NEGATIVE for unusual urinary or vaginal symptoms. Periods are regular.  MUSCULOSKELETAL: NEGATIVE for significant arthralgias or myalgia  NEURO: NEGATIVE for weakness, dizziness or paresthesias  PSYCHIATRIC: NEGATIVE for changes in mood or affect    OBJECTIVE:   /60   Pulse 90   Temp 98  F (36.7  C) (Oral)   Ht 1.549 m (5' 1\")   Wt 53.1 kg (117 lb)   SpO2 100%   BMI 22.11 kg/m    EXAM:  GENERAL: healthy, alert and no distress  EYES: Eyes grossly normal to inspection, PERRL and conjunctivae and sclerae normal  HENT: ear canals and TM's normal, nose and mouth without ulcers or lesions  NECK: no adenopathy and no asymmetry, masses, or scars  RESP: lungs clear to auscultation - no rales, rhonchi or wheezes  CV: regular rate and rhythm, normal S1 S2, no S3 or S4, no murmur, click or rub, no peripheral edema and peripheral pulses strong  ABDOMEN: soft, nontender, no hepatosplenomegaly, no masses and bowel sounds normal  MS: no gross musculoskeletal defects noted, no edema  SKIN: no suspicious lesions or rashes  NEURO: Normal strength and tone, mentation intact and speech normal  PSYCH: mentation appears normal, affect normal/bright    Diagnostic Test Results:  none     ASSESSMENT/PLAN:   1. Routine general medical examination at a health care facility: health maintenance reviewed and updated    2. Vitamin D deficiency: will recheck " "vitamin D levels.  - Vitamin D Deficiency; Future  - Vitamin D Deficiency    3. Screening for diabetes mellitus  - **A1C FUTURE anytime; Future  - **Basic metabolic panel FUTURE anytime; Future  - **Basic metabolic panel FUTURE anytime  - **A1C FUTURE anytime    4. Screening for hyperlipidemia  - Lipid panel reflex to direct LDL Fasting; Future  - Lipid panel reflex to direct LDL Fasting    COUNSELING:   Reviewed preventive health counseling, as reflected in patient instructions       Regular exercise       Healthy diet/nutrition    Estimated body mass index is 22.11 kg/m  as calculated from the following:    Height as of this encounter: 1.549 m (5' 1\").    Weight as of this encounter: 53.1 kg (117 lb).         reports that she has never smoked. She has never used smokeless tobacco.      Counseling Resources:  ATP IV Guidelines  Pooled Cohorts Equation Calculator  Breast Cancer Risk Calculator  FRAX Risk Assessment  ICSI Preventive Guidelines  Dietary Guidelines for Americans, 2010  USDA's MyPlate  ASA Prophylaxis  Lung CA Screening    Paul Hood,   [facility]OFFMAN  "

## 2019-10-29 LAB
ANION GAP SERPL CALCULATED.3IONS-SCNC: 4 MMOL/L (ref 3–14)
BUN SERPL-MCNC: 11 MG/DL (ref 7–30)
CALCIUM SERPL-MCNC: 9.1 MG/DL (ref 8.5–10.1)
CHLORIDE SERPL-SCNC: 104 MMOL/L (ref 94–109)
CHOLEST SERPL-MCNC: 180 MG/DL
CO2 SERPL-SCNC: 28 MMOL/L (ref 20–32)
CREAT SERPL-MCNC: 0.5 MG/DL (ref 0.52–1.04)
DEPRECATED CALCIDIOL+CALCIFEROL SERPL-MC: 31 UG/L (ref 20–75)
GFR SERPL CREATININE-BSD FRML MDRD: >90 ML/MIN/{1.73_M2}
GLUCOSE SERPL-MCNC: 77 MG/DL (ref 70–99)
HDLC SERPL-MCNC: 47 MG/DL
LDLC SERPL CALC-MCNC: 124 MG/DL
NONHDLC SERPL-MCNC: 133 MG/DL
POTASSIUM SERPL-SCNC: 3.7 MMOL/L (ref 3.4–5.3)
SODIUM SERPL-SCNC: 136 MMOL/L (ref 133–144)
TRIGL SERPL-MCNC: 47 MG/DL

## 2019-12-11 ENCOUNTER — MYC MEDICAL ADVICE (OUTPATIENT)
Dept: FAMILY MEDICINE | Facility: CLINIC | Age: 29
End: 2019-12-11

## 2019-12-17 ENCOUNTER — ALLIED HEALTH/NURSE VISIT (OUTPATIENT)
Dept: NURSING | Facility: CLINIC | Age: 29
End: 2019-12-17
Payer: COMMERCIAL

## 2019-12-17 DIAGNOSIS — Z11.1 VISIT FOR MANTOUX TEST: ICD-10-CM

## 2019-12-17 DIAGNOSIS — R76.11 MANTOUX: POSITIVE: Primary | ICD-10-CM

## 2019-12-17 PROCEDURE — 86580 TB INTRADERMAL TEST: CPT

## 2019-12-19 ENCOUNTER — ALLIED HEALTH/NURSE VISIT (OUTPATIENT)
Dept: NURSING | Facility: CLINIC | Age: 29
End: 2019-12-19
Payer: COMMERCIAL

## 2019-12-19 DIAGNOSIS — Z11.1 SCREENING EXAMINATION FOR PULMONARY TUBERCULOSIS: Primary | ICD-10-CM

## 2019-12-19 LAB
PPDINDURATION: 0 MM (ref 0–5)
PPDREDNESS: 0 MM (ref 0–0)

## 2019-12-19 NOTE — PROGRESS NOTES
Mantoux results: No induration.  No swelling.  No redness.    MARY BernardN, RN  [employer] [facility]

## 2019-12-22 NOTE — RESULT ENCOUNTER NOTE
Result(s) was/were reviewed in the clinic with patient by RN.  Electronically Signed By: Lidya Grijalva PA-C

## 2020-08-02 ENCOUNTER — HOSPITAL ENCOUNTER (EMERGENCY)
Facility: CLINIC | Age: 30
Discharge: HOME | End: 2020-08-02
Attending: EMERGENCY MEDICINE | Admitting: EMERGENCY MEDICINE
Payer: COMMERCIAL

## 2020-08-02 ENCOUNTER — APPOINTMENT (OUTPATIENT)
Dept: GENERAL RADIOLOGY | Facility: CLINIC | Age: 30
End: 2020-08-02
Attending: EMERGENCY MEDICINE
Payer: COMMERCIAL

## 2020-08-02 ENCOUNTER — APPOINTMENT (OUTPATIENT)
Dept: ULTRASOUND IMAGING | Facility: CLINIC | Age: 30
End: 2020-08-02
Attending: OBSTETRICS & GYNECOLOGY
Payer: COMMERCIAL

## 2020-08-02 VITALS
TEMPERATURE: 98.1 F | HEART RATE: 109 BPM | SYSTOLIC BLOOD PRESSURE: 124 MMHG | RESPIRATION RATE: 19 BRPM | BODY MASS INDEX: 27.72 KG/M2 | HEIGHT: 61 IN | OXYGEN SATURATION: 97 % | DIASTOLIC BLOOD PRESSURE: 74 MMHG | WEIGHT: 146.8 LBS

## 2020-08-02 DIAGNOSIS — R07.9 CHEST PAIN, UNSPECIFIED TYPE: ICD-10-CM

## 2020-08-02 DIAGNOSIS — R50.9 FEVER IN ADULT: ICD-10-CM

## 2020-08-02 DIAGNOSIS — R35.0 URINARY FREQUENCY: ICD-10-CM

## 2020-08-02 LAB
ALBUMIN SERPL-MCNC: 2.9 G/DL (ref 3.4–5)
ALBUMIN UR-MCNC: NEGATIVE MG/DL
ALP SERPL-CCNC: 76 U/L (ref 40–150)
ALT SERPL W P-5'-P-CCNC: 19 U/L (ref 0–50)
ANION GAP SERPL CALCULATED.3IONS-SCNC: 7 MMOL/L (ref 3–14)
APPEARANCE UR: ABNORMAL
AST SERPL W P-5'-P-CCNC: 14 U/L (ref 0–45)
BACTERIA #/AREA URNS HPF: ABNORMAL /HPF
BASOPHILS # BLD AUTO: 0.1 10E9/L (ref 0–0.2)
BASOPHILS NFR BLD AUTO: 0.5 %
BILIRUB DIRECT SERPL-MCNC: <0.1 MG/DL (ref 0–0.2)
BILIRUB SERPL-MCNC: 0.2 MG/DL (ref 0.2–1.3)
BILIRUB UR QL STRIP: NEGATIVE
BUN SERPL-MCNC: 6 MG/DL (ref 7–30)
CALCIUM SERPL-MCNC: 8.8 MG/DL (ref 8.5–10.1)
CHLORIDE SERPL-SCNC: 107 MMOL/L (ref 94–109)
CO2 SERPL-SCNC: 23 MMOL/L (ref 20–32)
COLOR UR AUTO: ABNORMAL
CREAT SERPL-MCNC: 0.36 MG/DL (ref 0.52–1.04)
CREAT UR-MCNC: 43 MG/DL
DIFFERENTIAL METHOD BLD: NORMAL
EOSINOPHIL # BLD AUTO: 0.1 10E9/L (ref 0–0.7)
EOSINOPHIL NFR BLD AUTO: 1 %
ERYTHROCYTE [DISTWIDTH] IN BLOOD BY AUTOMATED COUNT: 13 % (ref 10–15)
GFR SERPL CREATININE-BSD FRML MDRD: >90 ML/MIN/{1.73_M2}
GLUCOSE SERPL-MCNC: 85 MG/DL (ref 70–99)
GLUCOSE UR STRIP-MCNC: NEGATIVE MG/DL
HCT VFR BLD AUTO: 37.7 % (ref 35–47)
HGB BLD-MCNC: 12 G/DL (ref 11.7–15.7)
HGB UR QL STRIP: NEGATIVE
IMM GRANULOCYTES # BLD: 0.3 10E9/L (ref 0–0.4)
IMM GRANULOCYTES NFR BLD: 2.7 %
KETONES UR STRIP-MCNC: NEGATIVE MG/DL
LACTATE BLD-SCNC: 1.1 MMOL/L (ref 0.7–2)
LEUKOCYTE ESTERASE UR QL STRIP: NEGATIVE
LYMPHOCYTES # BLD AUTO: 1.3 10E9/L (ref 0.8–5.3)
LYMPHOCYTES NFR BLD AUTO: 12.4 %
MCH RBC QN AUTO: 30.9 PG (ref 26.5–33)
MCHC RBC AUTO-ENTMCNC: 31.8 G/DL (ref 31.5–36.5)
MCV RBC AUTO: 97 FL (ref 78–100)
MONOCYTES # BLD AUTO: 1.2 10E9/L (ref 0–1.3)
MONOCYTES NFR BLD AUTO: 11.2 %
MUCOUS THREADS #/AREA URNS LPF: PRESENT /LPF
NEUTROPHILS # BLD AUTO: 7.8 10E9/L (ref 1.6–8.3)
NEUTROPHILS NFR BLD AUTO: 72.2 %
NITRATE UR QL: NEGATIVE
NRBC # BLD AUTO: 0 10*3/UL
NRBC BLD AUTO-RTO: 0 /100
PH UR STRIP: 7 PH (ref 5–7)
PLATELET # BLD AUTO: 259 10E9/L (ref 150–450)
POTASSIUM SERPL-SCNC: 3.5 MMOL/L (ref 3.4–5.3)
PROT SERPL-MCNC: 7.4 G/DL (ref 6.8–8.8)
PROT UR-MCNC: 0.09 G/L
PROT/CREAT 24H UR: 0.21 G/G CR (ref 0–0.2)
RBC # BLD AUTO: 3.88 10E12/L (ref 3.8–5.2)
RBC #/AREA URNS AUTO: 1 /HPF (ref 0–2)
SODIUM SERPL-SCNC: 137 MMOL/L (ref 133–144)
SOURCE: ABNORMAL
SP GR UR STRIP: 1.01 (ref 1–1.03)
SQUAMOUS #/AREA URNS AUTO: 1 /HPF (ref 0–1)
TROPONIN I SERPL-MCNC: <0.015 UG/L (ref 0–0.04)
UROBILINOGEN UR STRIP-MCNC: NORMAL MG/DL (ref 0–2)
WBC # BLD AUTO: 10.8 10E9/L (ref 4–11)
WBC #/AREA URNS AUTO: 2 /HPF (ref 0–5)

## 2020-08-02 PROCEDURE — 84156 ASSAY OF PROTEIN URINE: CPT | Performed by: EMERGENCY MEDICINE

## 2020-08-02 PROCEDURE — 87040 BLOOD CULTURE FOR BACTERIA: CPT | Performed by: EMERGENCY MEDICINE

## 2020-08-02 PROCEDURE — 87086 URINE CULTURE/COLONY COUNT: CPT | Performed by: EMERGENCY MEDICINE

## 2020-08-02 PROCEDURE — 80076 HEPATIC FUNCTION PANEL: CPT | Performed by: EMERGENCY MEDICINE

## 2020-08-02 PROCEDURE — 99285 EMERGENCY DEPT VISIT HI MDM: CPT | Mod: 25

## 2020-08-02 PROCEDURE — 71045 X-RAY EXAM CHEST 1 VIEW: CPT

## 2020-08-02 PROCEDURE — 25800030 ZZH RX IP 258 OP 636: Performed by: EMERGENCY MEDICINE

## 2020-08-02 PROCEDURE — 83605 ASSAY OF LACTIC ACID: CPT | Performed by: EMERGENCY MEDICINE

## 2020-08-02 PROCEDURE — 76819 FETAL BIOPHYS PROFIL W/O NST: CPT

## 2020-08-02 PROCEDURE — 80048 BASIC METABOLIC PNL TOTAL CA: CPT | Performed by: EMERGENCY MEDICINE

## 2020-08-02 PROCEDURE — 96360 HYDRATION IV INFUSION INIT: CPT

## 2020-08-02 PROCEDURE — U0003 INFECTIOUS AGENT DETECTION BY NUCLEIC ACID (DNA OR RNA); SEVERE ACUTE RESPIRATORY SYNDROME CORONAVIRUS 2 (SARS-COV-2) (CORONAVIRUS DISEASE [COVID-19]), AMPLIFIED PROBE TECHNIQUE, MAKING USE OF HIGH THROUGHPUT TECHNOLOGIES AS DESCRIBED BY CMS-2020-01-R: HCPCS | Performed by: EMERGENCY MEDICINE

## 2020-08-02 PROCEDURE — 84484 ASSAY OF TROPONIN QUANT: CPT | Performed by: EMERGENCY MEDICINE

## 2020-08-02 PROCEDURE — 85025 COMPLETE CBC W/AUTO DIFF WBC: CPT | Performed by: EMERGENCY MEDICINE

## 2020-08-02 PROCEDURE — 81001 URINALYSIS AUTO W/SCOPE: CPT | Performed by: EMERGENCY MEDICINE

## 2020-08-02 PROCEDURE — 93005 ELECTROCARDIOGRAM TRACING: CPT

## 2020-08-02 PROCEDURE — C9803 HOPD COVID-19 SPEC COLLECT: HCPCS

## 2020-08-02 RX ORDER — SODIUM CHLORIDE 9 MG/ML
INJECTION, SOLUTION INTRAVENOUS CONTINUOUS
Status: DISCONTINUED | OUTPATIENT
Start: 2020-08-02 | End: 2020-08-02 | Stop reason: HOSPADM

## 2020-08-02 RX ADMIN — SODIUM CHLORIDE 1000 ML: 9 INJECTION, SOLUTION INTRAVENOUS at 12:35

## 2020-08-02 ASSESSMENT — ENCOUNTER SYMPTOMS
FREQUENCY: 1
MYALGIAS: 1
VOMITING: 0
SHORTNESS OF BREATH: 0
FEVER: 1
DIARRHEA: 0
COUGH: 0
BACK PAIN: 1

## 2020-08-02 ASSESSMENT — MIFFLIN-ST. JEOR: SCORE: 1313.26

## 2020-08-02 NOTE — ED AVS SNAPSHOT
[facility]  [address]  [facility] [address]  Phone:  [phone]  Fax:  [phone]                                    Fred Hernandez   MRN: [id number]    Department:  [facility]   Date of Visit:  8/2/2020           After Visit Summary Signature Page    I have received my discharge instructions, and my questions have been answered. I have discussed any challenges I see with this plan with the nurse or doctor.    ..........................................................................................................................................  Patient/Patient Representative Signature      ..........................................................................................................................................  Patient Representative Print Name and Relationship to Patient    ..................................................               ................................................  Date                                   Time    ..........................................................................................................................................  Reviewed by Signature/Title    ...................................................              ..............................................  Date                                               Time          22EPIC Rev 08/18

## 2020-08-02 NOTE — PLAN OF CARE
EFM/EUM on in ER 1,  moderate variability, not feeling baby move so much today, no contractions, reassuring tracing in ER  Tracing reviewed by Dr Yulisa Meyer Dr on call, BPP ordered

## 2020-08-02 NOTE — ED PROVIDER NOTES
History   Chief Complaint  Dysuria; Chest Pain; and Fever    HPI   Fred Hernandez is a 30 year old female who is 30 weeks pregnant who presents for evaluation of fever, low back pain, urinary frequency, decreased fetal movement, body aches, acute on chronic left-sided chest pain.  Patient states she is 30 weeks pregnant and follows with an OB group at [facility], Dr Pace.  Since yesterday she has had fever and urinary frequency and low back pain.  She also feels that baby is moving less.  She denies vaginal bleeding or loss of vaginal fluid.  Patient has had intermittent left-sided chest discomfort since prior to her pregnancy but over the past few days she states it is more frequent.  It comes in waves lasting a few minutes at a time.  Chest pain seems to be better with deep breathing.  She is not short of breath.  She denies cough or vomiting or diarrhea.  She has been taking Tylenol for her symptoms which she last took around 9 AM. She informed me that she has had stable vaginal discharge but informed OB RN of malodorous discharge recently.     Allergies  Iodine    Medications  Prenatal vitamin  Omega-3    Past Medical History  Chicken Pox  Vitamin D deficiency    Past Surgical History  The patient does not have any pertinent past surgical history.    Family History  Hyperlipidemia  Hypertension  Hepatitis  diabetes    Social History  Tobacco use: never smoker  Alcohol use: no  Drug use: no  Marital Status:   Occupational History: Nurse practitioner.    Review of Systems   Constitutional: Positive for fever.   Respiratory: Negative for cough and shortness of breath.    Cardiovascular: Positive for chest pain (left sided).   Gastrointestinal: Negative for diarrhea and vomiting.   Genitourinary: Positive for frequency. Negative for vaginal bleeding and vaginal discharge.   Musculoskeletal: Positive for back pain and myalgias.   All other systems reviewed and are negative.    Physical Exam  "    Patient Vitals for the past 24 hrs:   BP Temp Temp src Pulse Heart Rate Resp SpO2 Height Weight   08/02/20 1400 120/81 98.1  F (36.7  C) Oral 104 103 20 95 % -- --   08/02/20 1345 119/86 -- -- 104 105 23 96 % -- --   08/02/20 1330 107/68 -- -- 104 102 14 96 % -- --   08/02/20 1315 107/77 -- -- 103 101 16 96 % -- --   08/02/20 1300 -- -- -- -- 98 -- -- -- --   08/02/20 1230 (!) 130/96 -- -- 106 -- -- 97 % -- --   08/02/20 1215 (!) 140/99 -- -- 104 -- -- 98 % -- --   08/02/20 1200 -- -- -- -- -- -- 98 % -- --   08/02/20 1117 120/80 98.1  F (36.7  C) Temporal -- 107 16 -- 1.549 m (5' 1\") 66.6 kg (146 lb 12.8 oz)       Physical Exam  VS: Reviewed per above  HENT: Mucous membranes moist. No nuchal rigidity  EYES: sclera anicteric  CV: Rate as noted, regular rhythm.   RESP: Effort normal. Breath sounds are normal bilaterally.  GI: no tenderness/rebound/guarding, gravid uterus  NEURO: Alert, moving all extremities  MSK: No deformity of the extremities  SKIN: Warm and dry    Emergency Department Course   EKG  Indication: chest pain  Time: 1122am  Rate 103 bpm. ME interval 132ms. QRS duration 78ms. QT/QTc 340/445.   Sinus tachycardia, otherwise normal ECG  Read time: 1125AM  Read by Raheem Cespedes MD    Imaging:  Radiology findings were communicated with the patient who voiced understanding of the findings.    XR Chest Port 1 View  IMPRESSION: Single AP view of the chest was obtained.  Cardiomediastinal silhouette is within normal limits. No suspicious  focal pulmonary opacities. No significant pleural effusion or  pneumothorax.    Readings per Radiology    Laboratory:  Laboratory findings were communicated with the patient who voiced understanding of the findings.    CBC: WBC: 10.8, HGB: 12.0, PLT: 259  BMP: Glucose 85, Urea Nitrogen: 6 (low), o/w WNL (Creatinine: 0.36)  Hepatic Panel: Albumin: 2.9 (low), o/w WNL  Lactic acid (Resulted at 1256): 1.1  Troponin I (Collected at 1234): <0.015    Protein Random urine w/ " Creat Ratio: Protein Total: 0.21 (high), o/w WNL  Creatinine urine calculation only: WNL    UA with Microscopic: Bacteria: Few (A), Mucous: Present (A), o/w WNL  Urine Culture: pending  Blood Culture (x2): pending    Symptomatic COVID-19 (Coronavirus) PCR by Nasopharyngeal Swab: Pending    Interventions:  1235 NS 1L IV  Medications   0.9% sodium chloride BOLUS (0 mLs Intravenous Stopped 8/2/20 1335)     Followed by   sodium chloride 0.9% infusion (has no administration in time range)       Emergency Department Course:  Past medical records, nursing notes, and vitals reviewed.    1207 I physically examined the patient as documented above.    A Nasopharyngeal swab was obtained here in the ED.  An oropharyngeal swab was obtained here in the ED.  EKG obtained in the ED, see results above.   IV was inserted and blood was drawn for laboratory testing, results above.  The patient provided a urine sample here in the emergency department. This was sent for laboratory testing, findings above.  The patient was sent for radiographs while in the emergency department, results above.     I rechecked the patient and discussed the findings of their workup thus far.     I spoke with Dr Valente of [facility]. Plan for BPP and then discharge from ER if reassuring per OB evaluation.    Impression & Plan     Medical Decision Making:  Patient presents to the ER for evaluation of fever, low back pain, urinary frequency, acute on chronic left-sided chest pain.  On arrival she has low-grade sinus tachycardia.  She is afebrile.  On exam abdomen is gravid but no tenderness or peritoneal signs.  Lungs are clear to auscultation.  No nuchal rigidity.    No evidence of UTI or significant leukocytosis or lactic acidosis.  COVID-19 testing was sent due to viral-like syndrome during pandemic.  EKG and troponin do not suggest ACS.  Chest x-ray is clear.  I did consider PE but it sounds as though this pain has been chronic even prior to pregnancy  better with deep breathing.  Patient had a borderline blood pressure on arrival that improved without intervention.  No laboratory evidence to suggest preeclampsia.  OB nurse came down to do fetal monitoring and there was concern for decreased reactivity on fetal strip.  I discussed the case with Dr Valente of Ob with plans for biophysical profile testing and discharge from ER if reassuring.  Upon signout to my colleague, patient was awaiting biophysical profile.    Covid-19  Fred Hernandez was evaluated during a global COVID-19 pandemic, which necessitated consideration that the patient might be at risk for infection with the SARS-CoV-2 virus that causes COVID-19.   Applicable protocols for evaluation were followed during the patient's care.   COVID-19 was considered as part of the patient's evaluation. The plan for testing is:  a test was obtained during this visit.      Diagnosis:    ICD-10-CM    1. Chest pain, unspecified type  R07.9    2. Fever in adult  R50.9    3. Urinary frequency  R35.0        Disposition:  Signed out to Dr. Parson, pending BPP.    Discharge Medications:  New Prescriptions    No medications on file       Scribe Disclosure:  IEmilie, am serving as a scribe at 2:41 PM on 8/2/2020 to document services personally performed by Raheem Cespedes MD based on my observations and the provider's statements to me.      Scribe Disclosure:  Brian ENRIQUE, am serving as a scribe at 12:07 PM on 8/2/2020 to document services personally performed by Raheem Cespedes MD based on my observations and the provider's statements to me.      Raheem Cespedes MD  08/02/20 2228

## 2020-08-02 NOTE — PROGRESS NOTES
Reviewed fetal heart tone strip obtained by L&D nurse.  No decels.  Moderate variability.  Appropriate for 30 weeks gestation without prior reactive strip documented.  Will obtain BPP.  If WNL no further fetal assessment needed and patient can be seen in follow up with her primary OB early this week. [place]

## 2020-08-02 NOTE — ED PROVIDER NOTES
Patient signed out to me pending BPP, if negative home    Spoke to OB about the BPP. This was reassuring and okay for discharge home with follow-up with OB this week     Kaylie Parson MD  08/02/20 3270

## 2020-08-02 NOTE — ED TRIAGE NOTES
Dysuria with frequency. Also with 10 year history of chest pain that has an increased frequency recently. Taking a deep breath decreases pain. Also reports fevers.

## 2020-08-02 NOTE — DISCHARGE INSTRUCTIONS
Follow up with your OB doctor. Return for worsening pain or new trouble breathing.     Discharge Instructions  COVID-19    Your Provider has determined that you should practice self-isolation and self-monitoring in order to protect yourself and your community from COVID-19, which is the disease caused by a new coronavirus. The virus spreads from person to person primarily by droplets when an infected person coughs or sneezes and the droplet either lands on another person or that other person touches a surface with the droplet on it. Diagnosis of COVID-19 can be made with a test but many times the test is unavailable or not necessary. There is no specific treatment or medicine for the disease.    Symptoms of COVID-19    Many people have no symptoms or mild symptoms.  Symptoms may usually appear 4 to 5 days (up to 14 days) after contact with another ill person. Some people will get severe symptoms and pneumonia. Usual symptoms are:     ? Fever  ? Cough  ? Trouble breathing  ? Less common symptoms are: Headache, body aches, sore throat, sneezing, diarrhea, loss of taste or smell.    How to Care for Yourself Stay home.      Most people will recover from illness with mild symptoms.  Isolation by staying home is the best method to prevent the spread of the illness. Do not go to work or school. Have a friend or relative do your shopping. Do not use public transportation (bus, train) or ridesharing (Lyft, Uber).    How long should I stay home?    If you have symptoms of COVID, you should stay home for at least 10 days, and for 24 hours with no fever and improvement of symptoms--whichever is longer. (Your fever should be gone for 24 hours without using fever-reducing medicine)  For example, if you have a fever and cough for 6 days, you need to stay home 4 more days with no fever for a total of 10 days. Or, if you have a fever and cough for 10 days, you need to stay home one more day with no fever for a total of 11  days.    Separate yourself from other people in your home.?As much as possible, you should stay in one room and away from other people in your home. Also, use a separate bathroom, if possible. Avoid handling pets or other animals while sick.     Wear a facemask if you need to be around other people and cover your mouth and nose with a tissue when you cough or sneeze.     Avoid sharing personal household items. You should not share dishes, drinking glasses, forks/knives/spoons, towels, or bedding with other people in your home. After using these items, they should be washed with soap and water. Clean parts of your home that are touched often (doorknobs, faucets, countertops, etc.) daily.     Wash your hands often with soap and water for at least 20 seconds or use an alcohol-based hand  containing at least 60% alcohol.     Avoid touching your face.    Treat your symptoms. You can take Acetaminophen (Tylenol) to treat body aches and fever as needed for comfort. Ibuprofen (Advil or Motrin) can be used as well if you still have symptoms after taking Tylenol. Drink fluids. Rest.    Watch for worsening symptoms such as shortness of breath/difficulty breathing or very severe weakness.    Employers/workplaces are being asked by the Centers for Disease Control (CDC) to not request notes/documentation for you to return to work or prove that you were ill. You may choose to show your employer this paperwork. Also, repeat testing should not be required to return to work.    Return to the Emergency Department if:    If you are developing worsening breathing, shortness of breath, or feel worse you should seek medical attention.  If you are uncertain, contact your health care provider/clinic. If you need emergency medical attention, call 911 and tell them you have been ill.      Discharge Instructions  Chest Pain    You have been seen today for chest pain or discomfort.  At this time, your provider has found no signs that  your chest pain is due to a serious or life-threatening condition, (or you have declined more testing and/or admission to the hospital). However, sometimes there is a serious problem that does not show up right away. Your evaluation today may not be complete and you may need further testing and evaluation.     Generally, every Emergency Department visit should have a follow-up clinic visit with either a primary or a specialty clinic/provider. Please follow-up as instructed by your emergency provider today.  Return to the Emergency Department if:  Your chest pain changes, gets worse, starts to happen more often, or comes with less activity.  You are newly short of breath.  You get very weak or tired.  You pass out or faint.  You have any new symptoms, like fever, cough, numb legs, or you cough up blood.  You have anything else that worries you.    Until you follow-up with your regular provider, please do the following:  Take one aspirin daily unless you have an allergy or are told not to by your provider.  If a stress test appointment has been made, go to the appointment.  If you have questions, contact your regular provider.  Follow-up with your regular provider/clinic as directed; this is very important.    If you were given a prescription for medicine here today, be sure to read all of the information (including the package insert) that comes with your prescription.  This will include important information about the medicine, its side effects, and any warnings that you need to know about.  The pharmacist who fills the prescription can provide more information and answer questions you may have about the medicine.  If you have questions or concerns that the pharmacist cannot address, please call or return to the Emergency Department.       Remember that you can always come back to the Emergency Department if you are not able to see your regular provider in the amount of time listed above, if you get any new  symptoms, or if there is anything that worries you.

## 2020-08-03 LAB
BACTERIA SPEC CULT: NO GROWTH
INTERPRETATION ECG - MUSE: NORMAL
Lab: NORMAL
SARS-COV-2 RNA SPEC QL NAA+PROBE: NOT DETECTED
SPECIMEN SOURCE: NORMAL
SPECIMEN SOURCE: NORMAL

## 2020-08-08 LAB
BACTERIA SPEC CULT: NO GROWTH
BACTERIA SPEC CULT: NO GROWTH
SPECIMEN SOURCE: NORMAL
SPECIMEN SOURCE: NORMAL

## 2021-01-03 ENCOUNTER — HEALTH MAINTENANCE LETTER (OUTPATIENT)
Age: 31
End: 2021-01-03

## 2021-09-28 ENCOUNTER — HOSPITAL ENCOUNTER (EMERGENCY)
Facility: CLINIC | Age: 31
End: 2021-09-28
Payer: COMMERCIAL

## 2021-09-28 ENCOUNTER — HOSPITAL ENCOUNTER (OUTPATIENT)
Facility: CLINIC | Age: 31
Discharge: HOME | End: 2021-09-28
Attending: OBSTETRICS & GYNECOLOGY | Admitting: OBSTETRICS & GYNECOLOGY
Payer: COMMERCIAL

## 2021-09-28 ENCOUNTER — HOSPITAL ENCOUNTER (EMERGENCY)
Facility: CLINIC | Age: 31
Discharge: HOME | End: 2021-09-29
Attending: EMERGENCY MEDICINE | Admitting: EMERGENCY MEDICINE
Payer: COMMERCIAL

## 2021-09-28 ENCOUNTER — HOSPITAL ENCOUNTER (OUTPATIENT)
Facility: CLINIC | Age: 31
End: 2021-09-28
Admitting: OBSTETRICS & GYNECOLOGY
Payer: COMMERCIAL

## 2021-09-28 VITALS
DIASTOLIC BLOOD PRESSURE: 75 MMHG | HEART RATE: 89 BPM | RESPIRATION RATE: 21 BRPM | OXYGEN SATURATION: 96 % | TEMPERATURE: 97.1 F | SYSTOLIC BLOOD PRESSURE: 120 MMHG

## 2021-09-28 VITALS
DIASTOLIC BLOOD PRESSURE: 88 MMHG | HEIGHT: 61 IN | RESPIRATION RATE: 18 BRPM | OXYGEN SATURATION: 96 % | WEIGHT: 145 LBS | SYSTOLIC BLOOD PRESSURE: 128 MMHG | BODY MASS INDEX: 27.38 KG/M2 | TEMPERATURE: 97.9 F

## 2021-09-28 VITALS
SYSTOLIC BLOOD PRESSURE: 139 MMHG | DIASTOLIC BLOOD PRESSURE: 86 MMHG | HEART RATE: 85 BPM | RESPIRATION RATE: 18 BRPM | OXYGEN SATURATION: 99 % | TEMPERATURE: 96.5 F

## 2021-09-28 DIAGNOSIS — R07.89 ATYPICAL CHEST PAIN: ICD-10-CM

## 2021-09-28 PROBLEM — Z36.89 ENCOUNTER FOR TRIAGE IN PREGNANT PATIENT: Status: ACTIVE | Noted: 2021-09-28

## 2021-09-28 LAB
ALBUMIN SERPL-MCNC: 2.9 G/DL (ref 3.4–5)
ALP SERPL-CCNC: 69 U/L (ref 40–150)
ALT SERPL W P-5'-P-CCNC: 20 U/L (ref 0–50)
ANION GAP SERPL CALCULATED.3IONS-SCNC: 9 MMOL/L (ref 3–14)
AST SERPL W P-5'-P-CCNC: 8 U/L (ref 0–45)
BASOPHILS # BLD AUTO: 0 10E3/UL (ref 0–0.2)
BASOPHILS NFR BLD AUTO: 0 %
BILIRUB SERPL-MCNC: 0.1 MG/DL (ref 0.2–1.3)
BUN SERPL-MCNC: 13 MG/DL (ref 7–30)
CALCIUM SERPL-MCNC: 10 MG/DL (ref 8.5–10.1)
CHLORIDE BLD-SCNC: 106 MMOL/L (ref 94–109)
CO2 SERPL-SCNC: 24 MMOL/L (ref 20–32)
CREAT SERPL-MCNC: 0.4 MG/DL (ref 0.52–1.04)
D DIMER PPP FEU-MCNC: 0.52 UG/ML FEU (ref 0–0.5)
EOSINOPHIL # BLD AUTO: 0.4 10E3/UL (ref 0–0.7)
EOSINOPHIL NFR BLD AUTO: 4 %
ERYTHROCYTE [DISTWIDTH] IN BLOOD BY AUTOMATED COUNT: 12.8 % (ref 10–15)
GFR SERPL CREATININE-BSD FRML MDRD: >90 ML/MIN/1.73M2
GLUCOSE BLD-MCNC: 95 MG/DL (ref 70–99)
HCT VFR BLD AUTO: 38.5 % (ref 35–47)
HGB BLD-MCNC: 12.6 G/DL (ref 11.7–15.7)
IMM GRANULOCYTES # BLD: 0.1 10E3/UL
IMM GRANULOCYTES NFR BLD: 1 %
LIPASE SERPL-CCNC: 62 U/L (ref 73–393)
LYMPHOCYTES # BLD AUTO: 2.1 10E3/UL (ref 0.8–5.3)
LYMPHOCYTES NFR BLD AUTO: 21 %
MCH RBC QN AUTO: 31.1 PG (ref 26.5–33)
MCHC RBC AUTO-ENTMCNC: 32.7 G/DL (ref 31.5–36.5)
MCV RBC AUTO: 95 FL (ref 78–100)
MONOCYTES # BLD AUTO: 0.6 10E3/UL (ref 0–1.3)
MONOCYTES NFR BLD AUTO: 6 %
NEUTROPHILS # BLD AUTO: 6.8 10E3/UL (ref 1.6–8.3)
NEUTROPHILS NFR BLD AUTO: 68 %
NRBC # BLD AUTO: 0 10E3/UL
NRBC BLD AUTO-RTO: 0 /100
PLATELET # BLD AUTO: 268 10E3/UL (ref 150–450)
POTASSIUM BLD-SCNC: 3.3 MMOL/L (ref 3.4–5.3)
PROT SERPL-MCNC: 7.2 G/DL (ref 6.8–8.8)
RBC # BLD AUTO: 4.05 10E6/UL (ref 3.8–5.2)
SODIUM SERPL-SCNC: 139 MMOL/L (ref 133–144)
TROPONIN I SERPL-MCNC: <0.015 UG/L (ref 0–0.04)
WBC # BLD AUTO: 10 10E3/UL (ref 4–11)

## 2021-09-28 PROCEDURE — 85379 FIBRIN DEGRADATION QUANT: CPT | Performed by: EMERGENCY MEDICINE

## 2021-09-28 PROCEDURE — 36415 COLL VENOUS BLD VENIPUNCTURE: CPT | Performed by: EMERGENCY MEDICINE

## 2021-09-28 PROCEDURE — 99284 EMERGENCY DEPT VISIT MOD MDM: CPT

## 2021-09-28 PROCEDURE — 93005 ELECTROCARDIOGRAM TRACING: CPT

## 2021-09-28 PROCEDURE — 84484 ASSAY OF TROPONIN QUANT: CPT | Performed by: EMERGENCY MEDICINE

## 2021-09-28 PROCEDURE — 82040 ASSAY OF SERUM ALBUMIN: CPT | Performed by: EMERGENCY MEDICINE

## 2021-09-28 PROCEDURE — G0463 HOSPITAL OUTPT CLINIC VISIT: HCPCS

## 2021-09-28 PROCEDURE — 83690 ASSAY OF LIPASE: CPT | Performed by: EMERGENCY MEDICINE

## 2021-09-28 PROCEDURE — 85025 COMPLETE CBC W/AUTO DIFF WBC: CPT | Performed by: EMERGENCY MEDICINE

## 2021-09-28 RX ORDER — ASCORBIC ACID 500 MG
500 TABLET ORAL
COMMUNITY
Start: 2020-11-06

## 2021-09-28 RX ORDER — LIDOCAINE 40 MG/G
CREAM TOPICAL
Status: DISCONTINUED | OUTPATIENT
Start: 2021-09-28 | End: 2021-09-28 | Stop reason: HOSPADM

## 2021-09-28 ASSESSMENT — ENCOUNTER SYMPTOMS: BACK PAIN: 1

## 2021-09-28 ASSESSMENT — MIFFLIN-ST. JEOR: SCORE: 1300.1

## 2021-09-29 LAB
ATRIAL RATE - MUSE: 82 BPM
DIASTOLIC BLOOD PRESSURE - MUSE: NORMAL MMHG
INTERPRETATION ECG - MUSE: NORMAL
P AXIS - MUSE: 53 DEGREES
PR INTERVAL - MUSE: 152 MS
QRS DURATION - MUSE: 80 MS
QT - MUSE: 358 MS
QTC - MUSE: 418 MS
R AXIS - MUSE: 44 DEGREES
SYSTOLIC BLOOD PRESSURE - MUSE: NORMAL MMHG
T AXIS - MUSE: 26 DEGREES
VENTRICULAR RATE- MUSE: 82 BPM

## 2021-09-29 NOTE — ED PROVIDER NOTES
History   Chief Complaint:  Chest Pain       HPI   Fred Hernandez is a 31 year old female with history of preeclampsia who presents with sharp chest pain radiating to the upper central back. Around 0830 this evening Fred reports having intermittent sharp chest and back pain. She also reports having consistent dual-pressure pain in her chest that is worsened with inhalation. She is ~27 weeks into her current pregnancy. This is her 4th pregnancy with 2 previous miscarriages and 1 livebirth.     Review of Systems   Cardiovascular: Positive for chest pain.   Musculoskeletal: Positive for back pain.   All other systems reviewed and are negative.    Allergies:  Iodine    Medications:  aspirin 81 MG EC tablet  calcium carbonate   Cholecalciferol 250 MCG   Magnesium Glycinate Plus PO  Omega-3 Fatty Acids  Prenatal MV-Min-Fe Fum-FA-DHA  vitamin C     Past Medical History:    HTN  Preeclampsia  Pregnancy  GBS UTI complicating pregnancy    Family History:    Diabetes  HTN  Hyperlipidemia  Lung cancer    Social History:  Patient accompanied by spouse  PCP: No Ref-Primary, Physician    Physical Exam     Patient Vitals for the past 24 hrs:   BP Temp Temp src Pulse Resp SpO2   09/28/21 2300 120/75 -- -- 89 21 96 %   09/28/21 2255 121/81 -- -- -- -- --   09/28/21 2244 -- 97.1  F (36.2  C) Temporal 86 18 100 %   09/28/21 2243 (!) 139/92 -- -- -- -- --       Physical Exam  Vitals reviewed.   Eyes:      Pupils: Pupils are equal, round, and reactive to light.   Cardiovascular:      Heart sounds: Normal heart sounds.   Pulmonary:      Effort: Pulmonary effort is normal.      Breath sounds: Normal breath sounds.   Abdominal:      General: Bowel sounds are normal.      Palpations: Abdomen is soft.      Comments: Distended consistent with 27-week pregnancy.  No tenderness   Musculoskeletal:         General: Normal range of motion.      Cervical back: Normal range of motion and neck supple.   Skin:     General: Skin is warm.       Capillary Refill: Capillary refill takes less than 2 seconds.   Neurological:      General: No focal deficit present.      Mental Status: She is alert.   Psychiatric:         Mood and Affect: Mood normal.         Emergency Department Course   ECG  ECG taken at 2248, ECG read at 0008  Normal sinus rhythm. Normal ECG   No previous ECG compared.  Rate 82 bpm. DE interval 152 ms. QRS duration 80 ms. QT/QTc 358/418 ms. P-R-T axes 53 44 26.     Laboratory:  CBC: WBC 10.0, HGB 12.6,    CMP: Potassium 3.3 (L), Albumin 2.9 (L), Bilirubin 0.1 (L) o/w WNL (Creatinine 0.4 (L))     Lipase: 62 (L)  D Dimer: 0.52 (H)  Troponin (Collected 2332): <0.015    Emergency Department Course:    Reviewed:  I reviewed nursing notes, vitals, past medical history and care everywhere    Assessments:  2345 I obtained history and examined the patient as noted above.   0010 I rechecked the patient and explained findings.     Disposition:  The patient was discharged to home.       Impression & Plan   Medical Decision Making:  Patient presents with chest pain she describes heaviness and sharp pain patient was seen in L&D and referred to the emergency room for further assessment.  Patient is low risk for PE but is pregnant at 27 weeks no history of prior VTE events.  D-dimer is 0.52 but with pregnancy adjusted D-dimer is not in the range of concerns me and therefore further investigation was not required EKG she has no signs of pericarditis troponin is negative.  I doubt to myocarditis at that significant event.  Patient is well-appearing requires no medication for pain and was discharged in stable condition.    Diagnosis:    ICD-10-CM    1. Atypical chest pain  R07.89        Discharge Medications:  Discharge Medication List as of 9/29/2021 12:20 AM          Scribe Disclosure:  IMiguelito, am serving as a scribe at 11:15 PM on 9/28/2021 to document services personally performed by Faisal Ying MD based on my observations  and the provider's statements to me.            Faisal Ying MD  09/30/21 0796

## 2021-09-29 NOTE — DISCHARGE INSTRUCTIONS
Your pregnancy adjusted D-dimer is normal your EKG and lab work showed no clear cause for chest pain.  We suspect this could be esophageal related or could be musculoskeletal related.  Okay to take Tylenol for pain.  Please follow-up with your obstetric physician if pain continues return to the emergency room with fever greater than 100.4 severe increase in shortness of breath or other concerns.  Good luck with your pregnancy.

## 2021-09-29 NOTE — DISCHARGE INSTRUCTIONS
Discharge Instruction for Undelivered Patients      You were seen for: Chest Pain  We Consulted: Dr. Hanks  You had (Test or Medicine):Fetal and uterine monitoring, further testing in the ED     Diet:   Drink 8 to 12 glasses of liquids (milk, juice, water) every day.  You may eat meals and snacks.     Activity:  Count fetal kicks everyday (see handout)  Call your doctor or nurse midwife if your baby is moving less than usual.     Call your provider if you notice:  Swelling in your face or increased swelling in your hands or legs.  Headaches that are not relieved by Tylenol (acetaminophen).  Changes in your vision (blurring: seeing spots or stars.)  Nausea (sick to your stomach) and vomiting (throwing up).   Weight gain of 5 pounds or more per week.  Heartburn that doesn't go away.  Signs of bladder infection: pain when you urinate (use the toilet), need to go more often and more urgently.  The bag of alonso (rupture of membranes) breaks, or you notice leaking in your underwear.  Bright red blood in your underwear.  Abdominal (lower belly) or stomach pain.  *If less than 34 weeks: Contractions (tightening) more than 6 times in one hour.  Increase or change in vaginal discharge (note the color and amount)  Other: Please contact your primary care provider with worsening symptoms, or any questions/concerns.    Follow-up:  As scheduled in the clinic.

## 2021-09-29 NOTE — PLAN OF CARE
Data: [facility] presented to [facility]: 2021 10:01 PM.  Reason for maternal/fetal assessment is chest pain. Patient reports chest pain starting around 2030, and increasing in intensity.  Patient reports pain as constant in nature, with periodic sharp pains that cause shortness of breath.  Patient reports the pain starts in her sternum, and wraps around marie upper back and ribs.  Patient reports she has had heartburn before and this feels different.  Patient has not taken any medications for the pain.  Patient reports she has never felt pain like this before.  Patient is a .  Prenatal record reviewed. Pregnancy has been uncomplicated, although previous pregnancy was complicated by postpartum pre-eclampsia.  Gestational Age 26w5d. VSS. Fetal movement active. Patient denies uterine contractions, leaking of vaginal fluid/rupture of membranes, vaginal bleeding, abdominal pain, pelvic pressure, nausea, vomiting, headache, visual disturbances, epigastric or URQ pain, significant edema. Support person is present.   Action: Verbal consent for EFM. Triage assessment completed. Bill of rights reviewed.  Response: Patient verbalized agreement with plan. Will contact Dr Hanks ([place]) with update and for further orders.

## 2021-09-29 NOTE — PLAN OF CARE
Data: Patient assessed in the [facility] for chest pain.  Cervical exam not examined.  Membranes intact.  Contractions/uterine assessment not present.  Action:  Presumed adequate fetal oxygenation documented (see flow record). Discharge instructions reviewed.  Patient instructed to report change in fetal movement, vaginal leaking of fluid or bleeding, abdominal pain, or any concerns related to the pregnancy to her nurse/physician.   Response: Orders to discharge home per Dr. Hanks.  Patient verbalized understanding of education and verbalized agreement with plan. Discharged to ED for further evaluation at 2037.  Patient was wheeled down to ED accompanied by this RN and her support person.

## 2021-09-29 NOTE — PROVIDER NOTIFICATION
09/28/21 1363   Provider Notification   Provider Name/Title Dr. Hanks   Method of Notification Phone   Request Evaluate - Remote   Notification Reason Patient Arrived;Status Update   Dr. Hanks informed of patient arrival and assessment including the following:    Reason for maternal/fetal assessment chest pain. Reviewed patient complaints (see admitting nursing note) and assessment including vital signs, and patient reports of pain, abdomen and uterus soft and non-tender to palpate.  Reviewed patient prenatal and baseline blood pressures at appointments. Fetal status appropriate for gestational age (gestation under 28 weeks). Plan per provider/orders received for patient to discharge and be evaluated in ED.

## 2021-10-10 ENCOUNTER — HEALTH MAINTENANCE LETTER (OUTPATIENT)
Age: 31
End: 2021-10-10

## 2022-01-29 ENCOUNTER — HEALTH MAINTENANCE LETTER (OUTPATIENT)
Age: 32
End: 2022-01-29

## 2022-09-18 ENCOUNTER — HEALTH MAINTENANCE LETTER (OUTPATIENT)
Age: 32
End: 2022-09-18

## 2023-05-07 ENCOUNTER — HEALTH MAINTENANCE LETTER (OUTPATIENT)
Age: 33
End: 2023-05-07

## 2024-07-14 ENCOUNTER — HEALTH MAINTENANCE LETTER (OUTPATIENT)
Age: 34
End: 2024-07-14